# Patient Record
Sex: FEMALE | Race: WHITE | NOT HISPANIC OR LATINO | Employment: FULL TIME | URBAN - METROPOLITAN AREA
[De-identification: names, ages, dates, MRNs, and addresses within clinical notes are randomized per-mention and may not be internally consistent; named-entity substitution may affect disease eponyms.]

---

## 2023-10-25 ENCOUNTER — TELEPHONE (OUTPATIENT)
Dept: PRIMARY CARE | Facility: CLINIC | Age: 73
End: 2023-10-25
Payer: MEDICARE

## 2023-10-25 NOTE — TELEPHONE ENCOUNTER
Patient was in observation unit at [place] oct 22, 2023 for 911 call of collapse with CPR [facility] hospital provider explained to patient that pace maker was checked and no interruption was noted,  patient has a follow up 2-28-24 would you like to see her sooner?

## 2023-12-07 ENCOUNTER — HOSPITAL ENCOUNTER (OUTPATIENT)
Dept: CARDIOLOGY | Facility: HOSPITAL | Age: 73
Discharge: HOME | End: 2023-12-07
Payer: MEDICARE

## 2023-12-07 DIAGNOSIS — I49.5 SICK SINUS SYNDROME (MULTI): ICD-10-CM

## 2023-12-07 DIAGNOSIS — Z95.0 PACEMAKER: ICD-10-CM

## 2023-12-07 DIAGNOSIS — Z95.0 PACEMAKER: Primary | ICD-10-CM

## 2023-12-07 PROCEDURE — 93296 REM INTERROG EVL PM/IDS: CPT

## 2023-12-07 PROCEDURE — 93294 REM INTERROG EVL PM/LDLS PM: CPT | Performed by: INTERNAL MEDICINE

## 2023-12-12 ENCOUNTER — ANCILLARY PROCEDURE (OUTPATIENT)
Dept: CARDIOLOGY | Facility: CLINIC | Age: 73
End: 2023-12-12
Payer: MEDICARE

## 2023-12-12 DIAGNOSIS — Z95.0 CARDIAC PACEMAKER IN SITU: ICD-10-CM

## 2023-12-12 DIAGNOSIS — I49.5 SICK SINUS SYNDROME (MULTI): ICD-10-CM

## 2023-12-12 DIAGNOSIS — I49.5 SINOATRIAL NODE DYSFUNCTION (MULTI): ICD-10-CM

## 2023-12-12 DIAGNOSIS — Z95.0 PACEMAKER: ICD-10-CM

## 2024-02-23 ENCOUNTER — APPOINTMENT (OUTPATIENT)
Dept: CARDIOLOGY | Facility: CLINIC | Age: 74
End: 2024-02-23
Payer: MEDICARE

## 2024-02-28 ENCOUNTER — OFFICE VISIT (OUTPATIENT)
Dept: CARDIOLOGY | Facility: CLINIC | Age: 74
End: 2024-02-28
Payer: MEDICARE

## 2024-02-28 VITALS
SYSTOLIC BLOOD PRESSURE: 160 MMHG | WEIGHT: 193.6 LBS | TEMPERATURE: 97.9 F | DIASTOLIC BLOOD PRESSURE: 102 MMHG | HEIGHT: 64 IN | BODY MASS INDEX: 33.05 KG/M2 | HEART RATE: 81 BPM

## 2024-02-28 DIAGNOSIS — Z95.0 PACEMAKER: Primary | ICD-10-CM

## 2024-02-28 PROCEDURE — 1036F TOBACCO NON-USER: CPT | Performed by: INTERNAL MEDICINE

## 2024-02-28 PROCEDURE — 1159F MED LIST DOCD IN RCRD: CPT | Performed by: INTERNAL MEDICINE

## 2024-02-28 PROCEDURE — 99213 OFFICE O/P EST LOW 20 MIN: CPT | Performed by: INTERNAL MEDICINE

## 2024-02-28 RX ORDER — CELECOXIB 200 MG/1
200 CAPSULE ORAL DAILY
COMMUNITY
Start: 2023-05-01

## 2024-02-28 RX ORDER — LISINOPRIL 20 MG/1
20 TABLET ORAL DAILY
COMMUNITY

## 2024-02-28 RX ORDER — CARVEDILOL 12.5 MG/1
12.5 TABLET ORAL
COMMUNITY
Start: 2016-09-19

## 2024-02-28 RX ORDER — LOSARTAN POTASSIUM 50 MG/1
50 TABLET ORAL
COMMUNITY
Start: 2022-01-11

## 2024-02-28 RX ORDER — PROCHLORPERAZINE MALEATE 10 MG
TABLET ORAL
COMMUNITY
Start: 2023-09-11

## 2024-02-28 RX ORDER — FLUTICASONE PROPIONATE 50 MCG
SPRAY, SUSPENSION (ML) NASAL DAILY
COMMUNITY
Start: 2016-12-20

## 2024-02-28 RX ORDER — GABAPENTIN 300 MG/1
600 CAPSULE ORAL 2 TIMES DAILY
COMMUNITY
Start: 2024-02-21

## 2024-02-28 RX ORDER — LOSARTAN POTASSIUM 100 MG/1
TABLET ORAL EVERY 24 HOURS
COMMUNITY

## 2024-02-28 RX ORDER — ERGOCALCIFEROL 1.25 MG/1
50000 CAPSULE ORAL
COMMUNITY
Start: 2020-08-31

## 2024-02-28 RX ORDER — PNV NO.95/FERROUS FUM/FOLIC AC 28MG-0.8MG
TABLET ORAL DAILY
COMMUNITY

## 2024-02-28 ASSESSMENT — PATIENT HEALTH QUESTIONNAIRE - PHQ9
1. LITTLE INTEREST OR PLEASURE IN DOING THINGS: NOT AT ALL
SUM OF ALL RESPONSES TO PHQ9 QUESTIONS 1 AND 2: 0
2. FEELING DOWN, DEPRESSED OR HOPELESS: NOT AT ALL

## 2024-02-28 NOTE — PROGRESS NOTES
Subjective:  The patient is a 73-year-old white female, followed primarily by Dr. Radha Webber, who presents for pacemaker follow-up.  She has a history of chronic bifascicular block that progressed to complete heart block.  She underwent [employer] DDDR pacemaker placement in August 2018.  Her pacemaker function has been normal.    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  She had such a spell while flying in an airplane several years ago.  She had another such spell in October 2023 and was taken to the hospital, where she was apparently hypotensive but improved with fluids.    The patient reports that she has been started on gabapentin for peripheral neuropathy.    Current Outpatient Medications   Medication Sig    ASPIRIN-ACETAMINOPHEN-CAFFEINE ORAL Take by mouth.    carvedilol (Coreg) 12.5 mg tablet Take 1 tablet (12.5 mg) by mouth 2 times a day with meals.    celecoxib (CeleBREX) 200 mg capsule Take 1 capsule (200 mg) by mouth once daily.    cyanocobalamin (Vitamin B-12) 100 mcg tablet Take by mouth once daily.    ergocalciferol (Vitamin D-2) 1.25 MG (33573 UT) capsule Take 1 capsule (50,000 Units) by mouth 1 (one) time per week.    fluticasone (Flonase) 50 mcg/actuation nasal spray once daily.    gabapentin (Neurontin) 300 mg capsule Take 2 capsules (600 mg) by mouth 2 times a day.    losartan (Cozaar) 50 mg tablet Take 1 tablet (50 mg) by mouth once daily.    prochlorperazine (Compazine) 10 mg tablet Take 1 tablet by mouth every 6 hours as needed (headache) for up to 7 days.    lisinopril 20 mg tablet Take 1 tablet (20 mg) by mouth once daily.    losartan (Cozaar) 100 mg tablet once every 24 hours.     Allergies:  Penicillins, Nitrofurantoin monohyd/m-cryst, and Lisinopril     Past Surgical History:   Procedure Laterality Date    HYSTERECTOMY  02/15/2017    Hysterectomy    OTHER SURGICAL HISTORY  06/29/2022    Pacemaker insertion    OTHER SURGICAL  "HISTORY  06/29/2022    Complete colonoscopy    TOTAL HIP ARTHROPLASTY  02/15/2017    Hip Replacement     Objective:  Vitals:    02/28/24 1121   BP: (!) 160/102   Pulse: 81   Temp: 36.6 °C (97.9 °F)   Height:     1.626 m (5' 4\")  Weight: 87.8 kg (193 lb 9.6 oz)     Exam:  Gen: Pleasant woman in no distress; alert and oriented.  HEENT: No scleral icterus.  Neck: No jugular venous distention or thyromegaly.  Left subclavian pacemaker pocket: Deep generator.  Lungs: Clear to auscultation, with no wheezes or rales.  Heart: Regular rhythm without extrasystoles, murmurs or gallops.  Abdomen: Benign, with no organomegaly or masses.  Extremities: Intact distal pulses; no edema.  Neuro: No focal neurologic abnormalities.  Skin: No cutaneous lesions.    Device Check:  A device check on 12/12/2023 showed normal pacemaker function.  The unit is programmed DDDR between 70 bpm 125 bpm, which provides 65% atrial pacing and virtually 100% ventricular pacing.  The lead parameters were good.  The battery longevity was estimated at 3.5 years.  There was no burden of atrial fibrillation.    Impressions:  1.  High-grade AV block, with complete pacemaker-dependency now.  2.  Status post [employer] DDDR pacemaker in August 2018, with normal device function.  3.  Hypertension, somewhat labile, with intermittent symptomatic hypotension as well.  4.  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  5.  Other medical problems, including obesity, degenerative joint and spine disease, and peripheral neuropathy, stable.    Recommendations:  1.  Patient's pacemaker will be checked remotely every 3 months.  2.  She will see me in the office on an annual basis.  3.  When she nears the elective replacement indicator of her device, an echocardiogram is advised to evaluate her LVEF.  If it is less than 50%, a case could be made to " upgrade her to a biventricular pacing system at the time of generator replacement.      Edenilson Yao MD

## 2024-03-20 ENCOUNTER — HOSPITAL ENCOUNTER (OUTPATIENT)
Dept: CARDIOLOGY | Facility: HOSPITAL | Age: 74
Discharge: HOME | End: 2024-03-20
Payer: MEDICARE

## 2024-03-20 DIAGNOSIS — I49.5 SICK SINUS SYNDROME (MULTI): ICD-10-CM

## 2024-03-20 DIAGNOSIS — Z95.0 PACEMAKER: ICD-10-CM

## 2024-03-20 PROCEDURE — 93294 REM INTERROG EVL PM/LDLS PM: CPT | Performed by: INTERNAL MEDICINE

## 2024-03-20 PROCEDURE — 93296 REM INTERROG EVL PM/IDS: CPT

## 2024-06-26 ENCOUNTER — HOSPITAL ENCOUNTER (OUTPATIENT)
Dept: CARDIOLOGY | Facility: HOSPITAL | Age: 74
Discharge: HOME | End: 2024-06-26
Payer: MEDICARE

## 2024-06-26 DIAGNOSIS — I49.5 SICK SINUS SYNDROME (MULTI): ICD-10-CM

## 2024-06-26 DIAGNOSIS — Z95.0 PACEMAKER: ICD-10-CM

## 2024-06-26 PROCEDURE — 93296 REM INTERROG EVL PM/IDS: CPT

## 2024-09-03 ENCOUNTER — APPOINTMENT (OUTPATIENT)
Dept: CARDIOLOGY | Facility: CLINIC | Age: 74
End: 2024-09-03
Payer: MEDICARE

## 2024-09-09 ENCOUNTER — APPOINTMENT (OUTPATIENT)
Dept: PHYSICAL THERAPY | Facility: CLINIC | Age: 74
End: 2024-09-09
Payer: MEDICARE

## 2024-09-16 ENCOUNTER — APPOINTMENT (OUTPATIENT)
Dept: PHYSICAL THERAPY | Facility: CLINIC | Age: 74
End: 2024-09-16
Payer: MEDICARE

## 2024-09-23 ENCOUNTER — APPOINTMENT (OUTPATIENT)
Dept: PHYSICAL THERAPY | Facility: CLINIC | Age: 74
End: 2024-09-23
Payer: MEDICARE

## 2024-09-30 ENCOUNTER — APPOINTMENT (OUTPATIENT)
Dept: PHYSICAL THERAPY | Facility: CLINIC | Age: 74
End: 2024-09-30
Payer: MEDICARE

## 2024-10-01 ENCOUNTER — HOSPITAL ENCOUNTER (OUTPATIENT)
Dept: CARDIOLOGY | Facility: HOSPITAL | Age: 74
Discharge: HOME | End: 2024-10-01
Payer: MEDICARE

## 2024-10-01 DIAGNOSIS — Z95.0 PACEMAKER: ICD-10-CM

## 2024-10-01 DIAGNOSIS — I49.5 SICK SINUS SYNDROME (MULTI): ICD-10-CM

## 2024-10-01 PROCEDURE — 93294 REM INTERROG EVL PM/LDLS PM: CPT | Performed by: INTERNAL MEDICINE

## 2024-10-01 PROCEDURE — 93296 REM INTERROG EVL PM/IDS: CPT

## 2024-12-10 ENCOUNTER — APPOINTMENT (OUTPATIENT)
Dept: CARDIOLOGY | Facility: CLINIC | Age: 74
End: 2024-12-10
Payer: MEDICARE

## 2024-12-10 VITALS
HEART RATE: 75 BPM | OXYGEN SATURATION: 96 % | WEIGHT: 195 LBS | DIASTOLIC BLOOD PRESSURE: 90 MMHG | BODY MASS INDEX: 33.29 KG/M2 | SYSTOLIC BLOOD PRESSURE: 136 MMHG | HEIGHT: 64 IN

## 2024-12-10 DIAGNOSIS — Z95.0 PACEMAKER: ICD-10-CM

## 2024-12-10 DIAGNOSIS — Z95.0 PACEMAKER: Primary | ICD-10-CM

## 2024-12-10 PROCEDURE — 99213 OFFICE O/P EST LOW 20 MIN: CPT | Performed by: INTERNAL MEDICINE

## 2024-12-10 PROCEDURE — 3008F BODY MASS INDEX DOCD: CPT | Performed by: INTERNAL MEDICINE

## 2024-12-10 PROCEDURE — 1126F AMNT PAIN NOTED NONE PRSNT: CPT | Performed by: INTERNAL MEDICINE

## 2024-12-10 PROCEDURE — 1159F MED LIST DOCD IN RCRD: CPT | Performed by: INTERNAL MEDICINE

## 2024-12-10 PROCEDURE — 1036F TOBACCO NON-USER: CPT | Performed by: INTERNAL MEDICINE

## 2024-12-10 PROCEDURE — 93280 PM DEVICE PROGR EVAL DUAL: CPT | Performed by: INTERNAL MEDICINE

## 2024-12-10 ASSESSMENT — PAIN SCALES - GENERAL: PAINLEVEL_OUTOF10: 0-NO PAIN

## 2024-12-10 NOTE — PROGRESS NOTES
Subjective:  The patient is a 74-year-old white female, followed primarily by Dr. Radha Webber, who presents for pacemaker follow-up.  She has a history of chronic bifascicular block that eventually progressed to complete heart block.  She underwent [employer] DDDR pacemaker placement in August 2018 and has been largely pacemaker-dependent since then.    The patient also has a propensity to neurally-mediated near-syncope and syncope, once occurring while flying in an airplane.  She had a spell in October 2023 in spite of pacing, and went to a local hospital where she was found to be hypotensive and was given intravenous fluids.  She also has some peripheral neuropathy and degenerative joint disease issues.    The patient is retired from a job as a  at [employer], which no longer exists.  I believe that she is  and lives alone.    Current Outpatient Medications   Medication Sig    carvedilol (Coreg) 12.5 mg tablet Take 1 tablet (12.5 mg) by mouth 2 times daily (morning and late afternoon).    celecoxib (CeleBREX) 200 mg capsule Take 1 capsule (200 mg) by mouth once daily.    cyanocobalamin (Vitamin B-12) 100 mcg tablet Take by mouth once daily.    fluticasone (Flonase) 50 mcg/actuation nasal spray once daily.    gabapentin (Neurontin) 300 mg capsule Take 2 capsules (600 mg) by mouth 2 times a day.    lisinopril 20 mg tablet Take 1 tablet (20 mg) by mouth once daily.    losartan (Cozaar) 50 mg tablet Take 1 tablet (50 mg) by mouth once daily.    prochlorperazine (Compazine) 10 mg tablet Take 1 tablet by mouth every 6 hours as needed (headache) for up to 7 days.     Allergies:  Penicillins, Nitrofurantoin monohyd/m-cryst, and Lisinopril     Past Surgical History:   Procedure Laterality Date    HYSTERECTOMY  02/15/2017    Hysterectomy    OTHER SURGICAL HISTORY  06/29/2022    Pacemaker insertion    OTHER SURGICAL HISTORY  06/29/2022    Complete colonoscopy    TOTAL HIP ARTHROPLASTY   "02/15/2017    Hip Replacement     Objective:  Vitals:    12/10/24 1541   BP: 136/90   Pulse: 75   SpO2: 96%   Height:     1.626 m (5' 4\")  Weight: 88.5 kg (195 lb)     Exam:  Gen: Pleasant woman in no distress; alert and oriented.  HEENT: No scleral icterus; wearing glasses.  Neck: No jugular venous distention or thyromegaly.  Left subclavian pacemaker pocket: Normal in appearance.  Lungs: Clear to auscultation, with no wheezes or rales.  Heart: Regular rhythm with grade 1/6 systolic ejection murmur and preserved S2.  Abdomen: Slightly obese but benign, with no organomegaly or masses.  Extremities: Intact distal pulses; no edema.  Neuro: No focal neurologic abnormalities.  Skin: No cutaneous lesions.    Device Check:  A [employer] pacemaker check was done today.  The unit is programmed DDDR from 70 to 120 bpm, which provides 59% atrial pacing and well over 99% ventricular pacing.  The lead parameters were good and the battery longevity estimated at 2.5 years.  There was no significant burden of atrial fibrillation or any ventricular arrhythmias.    Impressions:  1.  Complete heart block with pacemaker-dependency.  2.  Status post [employer] DDDR pacemaker placement in August 2018, with normal device function.  3.  Labile hypertension, under reasonable control on carvedilol, lisinopril, and losartan.  This combination is a bit unusual, with an ACEI and ARB together, and perhaps a drug such as amlodipine might provide a better benefit than lisinopril or losartan.  4.  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  5.  Other medical issues, including obesity, degenerative joint and spine disease, and peripheral neuropathy, followed by Dr. Webber.    Recommendations:  1.  The patient's device was checked remotely every 3 months.  2.  She will follow-up again in 1 year with an electrophysiologist for ongoing device management and will likely need generator " replacement a few years thereafter.  An echocardiogram would be reasonable prior to generator replacement to see if there is any left ventricular systolic dysfunction; if so, an upgrade to a biventricular system should be considered.      Edenilson Yao MD

## 2025-03-13 ENCOUNTER — HOSPITAL ENCOUNTER (OUTPATIENT)
Dept: CARDIOLOGY | Facility: HOSPITAL | Age: 75
Discharge: HOME | End: 2025-03-13
Payer: MEDICARE

## 2025-03-13 DIAGNOSIS — Z95.0 PACEMAKER: ICD-10-CM

## 2025-03-13 DIAGNOSIS — I49.5 SICK SINUS SYNDROME (MULTI): ICD-10-CM

## 2025-03-13 PROCEDURE — 93296 REM INTERROG EVL PM/IDS: CPT

## 2025-06-19 ENCOUNTER — HOSPITAL ENCOUNTER (OUTPATIENT)
Dept: CARDIOLOGY | Facility: HOSPITAL | Age: 75
Discharge: HOME | End: 2025-06-19
Payer: MEDICARE

## 2025-06-19 DIAGNOSIS — Z95.0 PACEMAKER: ICD-10-CM

## 2025-06-19 DIAGNOSIS — I49.5 SICK SINUS SYNDROME (MULTI): ICD-10-CM

## 2025-06-19 PROCEDURE — 93294 REM INTERROG EVL PM/LDLS PM: CPT | Performed by: INTERNAL MEDICINE

## 2025-06-19 PROCEDURE — 93296 REM INTERROG EVL PM/IDS: CPT

## 2025-08-05 ENCOUNTER — TELEPHONE (OUTPATIENT)
Dept: CARDIOLOGY | Facility: CLINIC | Age: 75
End: 2025-08-05
Payer: MEDICARE

## 2025-08-05 DIAGNOSIS — I49.5 SICK SINUS SYNDROME (MULTI): ICD-10-CM

## 2025-08-05 DIAGNOSIS — Z95.0 PACEMAKER: ICD-10-CM

## 2025-12-15 ENCOUNTER — APPOINTMENT (OUTPATIENT)
Dept: CARDIOLOGY | Facility: CLINIC | Age: 75
End: 2025-12-15
Payer: MEDICARE